# Patient Record
Sex: MALE | URBAN - METROPOLITAN AREA
[De-identification: names, ages, dates, MRNs, and addresses within clinical notes are randomized per-mention and may not be internally consistent; named-entity substitution may affect disease eponyms.]

---

## 2024-11-04 ENCOUNTER — TELEPHONE ENCOUNTER (OUTPATIENT)
Dept: URBAN - METROPOLITAN AREA CLINIC 19 | Facility: CLINIC | Age: 59
End: 2024-11-04

## 2024-11-06 ENCOUNTER — LAB OUTSIDE AN ENCOUNTER (OUTPATIENT)
Dept: URBAN - METROPOLITAN AREA CLINIC 19 | Facility: CLINIC | Age: 59
End: 2024-11-06

## 2024-11-06 ENCOUNTER — OFFICE VISIT (OUTPATIENT)
Dept: URBAN - METROPOLITAN AREA CLINIC 19 | Facility: CLINIC | Age: 59
End: 2024-11-06
Payer: COMMERCIAL

## 2024-11-06 VITALS
SYSTOLIC BLOOD PRESSURE: 138 MMHG | HEIGHT: 62 IN | BODY MASS INDEX: 47.11 KG/M2 | DIASTOLIC BLOOD PRESSURE: 82 MMHG | OXYGEN SATURATION: 100 % | WEIGHT: 256 LBS | TEMPERATURE: 98.1 F | HEART RATE: 87 BPM

## 2024-11-06 DIAGNOSIS — K22.89 ESOPHAGEAL THICKENING: ICD-10-CM

## 2024-11-06 PROCEDURE — 99203 OFFICE O/P NEW LOW 30 MIN: CPT

## 2024-11-06 RX ORDER — METFORMIN HYDROCHLORIDE 500 MG/1
TABLET, FILM COATED ORAL
Qty: 360 TABLET | Status: ACTIVE | COMMUNITY

## 2024-11-06 RX ORDER — LISINOPRIL 10 MG/1
TABLET ORAL
Qty: 90 TABLET | Status: ACTIVE | COMMUNITY

## 2024-11-06 RX ORDER — TIRZEPATIDE 2.5 MG/.5ML
INJECTION, SOLUTION SUBCUTANEOUS
Qty: 2 MILLILITER | Status: ACTIVE | COMMUNITY

## 2024-11-06 NOTE — HPI-TODAY'S VISIT:
59-year-old male with PMH of DM type II (on Ozempic), T5-T12 herniated discs, partial right nephrectomy, HLD and HTN presents today to schedule an EGD after CT chest and abdomen with contrast on 10/30/2024 revealed circumferential thickening of the mid to distal esophagus, possibly esophagitis.  Today, he offers no GI complaints. Denies dysphagia, nausea or vomiting. He has occasional heartburn when eating spicy foods. He is having daily normal bowell movements. Had colonoscopy with GI Specialist a year ago and was reportedly normal.

## 2024-11-25 ENCOUNTER — OFFICE VISIT (OUTPATIENT)
Dept: URBAN - METROPOLITAN AREA SURGERY CENTER 31 | Facility: SURGERY CENTER | Age: 59
End: 2024-11-25

## 2024-11-25 ENCOUNTER — TELEPHONE ENCOUNTER (OUTPATIENT)
Dept: URBAN - METROPOLITAN AREA CLINIC 19 | Facility: CLINIC | Age: 59
End: 2024-11-25

## 2024-11-25 RX ORDER — METFORMIN HYDROCHLORIDE 500 MG/1
TABLET, FILM COATED ORAL
Qty: 360 TABLET | Status: ACTIVE | COMMUNITY

## 2024-11-25 RX ORDER — LISINOPRIL 10 MG/1
TABLET ORAL
Qty: 90 TABLET | Status: ACTIVE | COMMUNITY

## 2024-11-25 RX ORDER — TIRZEPATIDE 2.5 MG/.5ML
INJECTION, SOLUTION SUBCUTANEOUS
Qty: 2 MILLILITER | Status: ACTIVE | COMMUNITY

## 2025-01-06 ENCOUNTER — OFFICE VISIT (OUTPATIENT)
Dept: URBAN - METROPOLITAN AREA CLINIC 19 | Facility: CLINIC | Age: 60
End: 2025-01-06

## 2025-01-06 RX ORDER — LISINOPRIL 10 MG/1
TABLET ORAL
Qty: 90 TABLET | Status: ACTIVE | COMMUNITY

## 2025-01-06 RX ORDER — TIRZEPATIDE 2.5 MG/.5ML
INJECTION, SOLUTION SUBCUTANEOUS
Qty: 2 MILLILITER | Status: ACTIVE | COMMUNITY

## 2025-01-06 RX ORDER — METFORMIN HYDROCHLORIDE 500 MG/1
TABLET, FILM COATED ORAL
Qty: 360 TABLET | Status: ACTIVE | COMMUNITY

## 2025-01-06 NOTE — HPI-TODAY'S VISIT:
Mr. Potts is a 59-year-old male with PMH of DM type II (on Ozempic), partial right nephrectomy, HLD, and HTN who presents today for follow-up after his EGD.  I saw him on 11/6/2024 after he had a CT of the chest and abdomen with contrast that revealed circumferential thickening of the mid to distal esophagus.  11/25/2024 EGD by Dr. Lee: Esophageal mucosal changes were present, including congestion (edema), erythema and nodularity, gastroesophageal flap valve classified as Hill grade II (fold present, opens with respiration), erythematous mucosa in the antrum, few gastric polyps, 2 cm hiatal hernia.  Distal esophageal biopsy consistent with columnar type mucosa and chronic inflammation, foveolar hyperplasia.  No intestinal metaplasia or fungal elements.  Stomach biopsy consistent with chronic gastritis.  No H. pylori was identified.  Fundic gland polyp.  Previous history summarized below: 2023 colonoscopy with GI specialist that was reportedly normal.

## 2025-01-10 ENCOUNTER — OFFICE VISIT (OUTPATIENT)
Dept: URBAN - METROPOLITAN AREA CLINIC 19 | Facility: CLINIC | Age: 60
End: 2025-01-10

## 2025-01-16 ENCOUNTER — DASHBOARD ENCOUNTERS (OUTPATIENT)
Age: 60
End: 2025-01-16

## 2025-01-16 ENCOUNTER — OFFICE VISIT (OUTPATIENT)
Dept: URBAN - METROPOLITAN AREA CLINIC 19 | Facility: CLINIC | Age: 60
End: 2025-01-16
Payer: COMMERCIAL

## 2025-01-16 VITALS
HEART RATE: 62 BPM | WEIGHT: 253 LBS | BODY MASS INDEX: 46.56 KG/M2 | SYSTOLIC BLOOD PRESSURE: 120 MMHG | OXYGEN SATURATION: 98 % | HEIGHT: 62 IN | TEMPERATURE: 97.9 F | DIASTOLIC BLOOD PRESSURE: 80 MMHG

## 2025-01-16 DIAGNOSIS — K44.9 HIATAL HERNIA: ICD-10-CM

## 2025-01-16 DIAGNOSIS — D13.1 FUNDIC GLAND POLYPS OF STOMACH, BENIGN: ICD-10-CM

## 2025-01-16 DIAGNOSIS — K20.90 ESOPHAGITIS: ICD-10-CM

## 2025-01-16 PROCEDURE — 99213 OFFICE O/P EST LOW 20 MIN: CPT

## 2025-01-16 RX ORDER — FAMOTIDINE 40 MG/1
1 TABLET TABLET, FILM COATED ORAL ONCE A DAY
Qty: 90 TABLET | Refills: 3 | OUTPATIENT
Start: 2025-01-16

## 2025-01-16 RX ORDER — LISINOPRIL 10 MG/1
TABLET ORAL
Qty: 90 TABLET | Status: ACTIVE | COMMUNITY

## 2025-01-16 RX ORDER — TIRZEPATIDE 2.5 MG/.5ML
INJECTION, SOLUTION SUBCUTANEOUS
Qty: 2 MILLILITER | Status: DISCONTINUED | COMMUNITY

## 2025-01-16 RX ORDER — METFORMIN HYDROCHLORIDE 500 MG/1
TABLET, FILM COATED ORAL
Qty: 360 TABLET | Status: ACTIVE | COMMUNITY

## 2025-01-16 NOTE — HPI-TODAY'S VISIT:
Mr. Potts returns today for follow-up after his EGD.  I saw him on 11/6/2024 after he had a CT of the chest and abdomen on 10/30/2024 that revealed circumferential thickening of the mid to distal esophagus.  11/25/2024 EGD with Dr. Lee: Esophageal congestion, edema, erythema and nodularity, erythematous mucosa in the antrum, few gastric polyps, and a 2 cm hiatal hernia was noted.  Distal esophageal biopsy with columnar type mucosa and chronic inflammation as well as foveolar hyperplasia.  No intestinal metaplasia identified.  Random biopsy noted chronic gastritis, negative for H. pylori.  Fundic gland polyp.  Reports occasional heartburn He takes NSAIDs occasionally for back pain He was recently started on GLP-1 (ozempic) for DM management. Has a daily normal bowel movement. No straining.  Had colonoscopy with GI Specialist a year ago and was reportedly normal.

## 2025-07-15 ENCOUNTER — OFFICE VISIT (OUTPATIENT)
Dept: URBAN - METROPOLITAN AREA CLINIC 19 | Facility: CLINIC | Age: 60
End: 2025-07-15

## 2025-07-22 ENCOUNTER — OFFICE VISIT (OUTPATIENT)
Dept: URBAN - METROPOLITAN AREA CLINIC 19 | Facility: CLINIC | Age: 60
End: 2025-07-22